# Patient Record
Sex: FEMALE | Race: WHITE
[De-identification: names, ages, dates, MRNs, and addresses within clinical notes are randomized per-mention and may not be internally consistent; named-entity substitution may affect disease eponyms.]

---

## 2017-07-26 ENCOUNTER — HOSPITAL ENCOUNTER (EMERGENCY)
Dept: HOSPITAL 25 - UCCORT | Age: 52
Discharge: HOME | End: 2017-07-26
Payer: COMMERCIAL

## 2017-07-26 VITALS — DIASTOLIC BLOOD PRESSURE: 73 MMHG | SYSTOLIC BLOOD PRESSURE: 123 MMHG

## 2017-07-26 DIAGNOSIS — J40: ICD-10-CM

## 2017-07-26 DIAGNOSIS — J32.9: Primary | ICD-10-CM

## 2017-07-26 DIAGNOSIS — Z87.891: ICD-10-CM

## 2017-07-26 PROCEDURE — 99212 OFFICE O/P EST SF 10 MIN: CPT

## 2017-07-26 PROCEDURE — G0463 HOSPITAL OUTPT CLINIC VISIT: HCPCS

## 2017-07-26 NOTE — UC
Throat Pain/Nasal Paras HPI





- HPI Summary


HPI Summary: 





FIVE DAYS FO CONGESTION, LAST NIGHT HIGH PITCHED COUGH, SHORTNESS OF BREATH. NO 

FEVER. 





- History of Current Complaint


Chief Complaint: UCRespiratory


Stated Complaint: COUGH


Time Seen by Provider: 07/26/17 19:51


Hx Obtained From: Patient


Hx Last Menstrual Period: 11 yrs


Onset/Duration: Gradual Onset, Lasting Weeks, Still Present


Severity: Moderate


Pain Intensity: 0


Pain Scale Used: 0-10 Numeric


Cough: Productive


Associated Signs & Symptoms: Positive: Sinus Discomfort, Nasal Discharge





- Epiglottits Risk Factors


Epiglottis Risk Factors: Negative





- Allergies/Home Medications


Allergies/Adverse Reactions: 


 Allergies











Allergy/AdvReac Type Severity Reaction Status Date / Time


 


Clindamycin Allergy  Rash Verified 07/26/17 19:52











Home Medications: 


 Home Medications





buPROPion TAB* [Wellbutrin TAB*] 100 mg PO DAILY 07/26/17 [History Confirmed 07/ 26/17]











PMH/Surg Hx/FS Hx/Imm Hx


Previously Healthy: Yes





- Surgical History


Surgical History: Yes


Surgery Procedure, Year, and Place: c sect x 3





- Family History


Known Family History: Positive: Cardiac Disease, Diabetes, Other - BLADDER 

CANCER


   Negative: Respiratory Disease





- Social History


Occupation: Employed Full-time


Lives: With Family


Alcohol Use: Occasionally


Substance Use Type: None


Smoking Status (MU): Former Smoker


Type: Cigarettes


Have You Smoked in the Last Year: Yes


When Did the Patient Quit Smoking/Using Tobacco: 6 MONTHS AGO





Review of Systems


Constitutional: Negative


Skin: Negative


Eyes: Negative


ENT: Ear Ache, Nasal Discharge, Sinus Congestion, Sinus Pain/Tenderness


Respiratory: Cough


Cardiovascular: Negative


Gastrointestinal: Negative


Genitourinary: Negative


Motor: Negative


Neurovascular: Negative


Musculoskeletal: Negative


Neurological: Negative


Psychological: Negative


All Other Systems Reviewed And Are Negative: Yes





Physical Exam


Triage Information Reviewed: Yes


Appearance: Well-Appearing, No Pain Distress, Well-Nourished


Vital Signs: 


 Initial Vital Signs











Temp  98.1 F   07/26/17 19:54


 


Pulse  97   07/26/17 19:54


 


Resp  28   07/26/17 19:54


 


BP  123/73   07/26/17 19:54


 


Pulse Ox  100   07/26/17 19:54











Vital Signs Reviewed: Yes


Eye Exam: Normal


ENT: Positive: Pharynx normal, Nasal congestion, TM bulging, TM dull


Dental Exam: Normal


Neck exam: Normal


Neck: Positive: Supple, Nontender, No Lymphadenopathy


Respiratory Exam: Other - COUGH


Respiratory: Positive: Chest non-tender, Lungs clear, Normal breath sounds, No 

respiratory distress, No accessory muscle use


Cardiovascular Exam: Normal


Cardiovascular: Positive: RRR, No Murmur, Pulses Normal


Abdominal Exam: Normal


Musculoskeletal Exam: Normal


Musculoskeletal: Positive: Strength Intact


Neurological Exam: Normal


Psychological Exam: Normal


Skin Exam: Normal





Throat Pain/Nasal Course/Dx





- Differential Dx/Diagnosis


Differential Diagnosis/HQI/PQRI: Pharyngitis, Tonsillitis, URI


Provider Diagnoses: SINUSITIS; BRONCHITIS





Discharge





- Discharge Plan


Condition: Stable


Disposition: HOME


Prescriptions: 


Azithromycin TAB* [Zithromax TAB (Z-CHRISTOPHER) 250 mg #6 tabs] 250 mg PO DAILY #4 tab


Benzonatate CAP* [Tessalon 100 MG CAP*] 100 mg PO TID PRN #15 cap


 PRN Reason: Cough


Patient Education Materials:  Sinusitis (ED), Upper Respiratory Infection (ED)


Referrals: 


Ayo HONEYCUTT,Angel CARROLL [Primary Care Provider] -

## 2018-01-29 ENCOUNTER — HOSPITAL ENCOUNTER (EMERGENCY)
Dept: HOSPITAL 25 - UCCORT | Age: 53
Discharge: HOME | End: 2018-01-29
Payer: COMMERCIAL

## 2018-01-29 VITALS — DIASTOLIC BLOOD PRESSURE: 72 MMHG | SYSTOLIC BLOOD PRESSURE: 134 MMHG

## 2018-01-29 DIAGNOSIS — J11.1: Primary | ICD-10-CM

## 2018-01-29 DIAGNOSIS — Z88.1: ICD-10-CM

## 2018-01-29 DIAGNOSIS — Z87.891: ICD-10-CM

## 2018-01-29 PROCEDURE — G0463 HOSPITAL OUTPT CLINIC VISIT: HCPCS

## 2018-01-29 PROCEDURE — 99212 OFFICE O/P EST SF 10 MIN: CPT

## 2018-01-29 NOTE — ED
Respiratory





- HPI Summary


HPI Summary: 





53 yr old with myalgias, cough, chills, nasal congestion.  Onset yesterday.  

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 Her daughter has symptoms as well a 

couple of days before. No other complaints. 





- History of Current Complaint


Chief Complaint: UCGeneralIllness


Stated Complaint: FLU SYMPTOMS


Time Seen by Provider: 01/29/18 20:44


Pain Intensity: 0





- Allergy/Home Medications


Allergies/Adverse Reactions: 


 Allergies











Allergy/AdvReac Type Severity Reaction Status Date / Time


 


Clindamycin Allergy  Rash Verified 01/29/18 20:39














PMH/Surg Hx/FS Hx/Imm Hx





- Surgical History


Surgery Procedure, Year, and Place: c sect x 3


Infectious Disease History: No


Infectious Disease History: 


   Denies: Traveled Outside the [place] in Last 30 Days





- Family History


Known Family History: Positive: Cardiac Disease, Diabetes, Other - BLADDER 

CANCER


   Negative: Respiratory Disease





- Social History


Occupation: Employed Full-time


Lives: With Family


Alcohol Use: Rare


Substance Use Type: Reports: None


Smoking Status (MU): Former Smoker


Type: Cigarettes


Have You Smoked in the Last Year: Yes





Review of Systems


Positive: Chills, Fatigue


Positive: Sore Throat, Nasal Discharge


Positive: Cough


All Other Systems Reviewed And Are Negative: Yes





Physical Exam


Triage Information Reviewed: Yes


Vital Signs On Initial Exam: 


 Initial Vitals











Temp Pulse Resp BP Pulse Ox


 


 98.6 F   113   24   134/72   100 


 


 01/29/18 20:35  01/29/18 20:35  01/29/18 20:35  01/29/18 20:35  01/29/18 20:35











Vital Signs Reviewed: Yes


Appearance: Positive: Well-Appearing, No Pain Distress


Skin: Positive: Warm, Skin Color Reflects Adequate Perfusion


Head/Face: Positive: Normal Head/Face Inspection


Eyes: Positive: EOMI


ENT: Positive: Pharynx normal, Nasal congestion, Nasal drainage, TMs normal


Neck: Positive: Nontender


Respiratory/Lung Sounds: Positive: Clear to Auscultation, Breath Sounds Present


Cardiovascular: Positive: RRR.  Negative: Murmur


Abdomen Description: Positive: Nontender


Musculoskeletal: Positive: Strength/ROM Intact


Neurological: Positive: Sensory/Motor Intact, Alert, Oriented to Person Place, 

Time, CN Intact II-III


Psychiatric: Positive: Normal


AVPU Assessment: Alert





- Divya Coma Scale


Best Eye Response: 4 - Spontaneous


Best Motor Response: 6 - Obeys Commands


Best Verbal Response: 5 - Oriented


Coma Scale Total: 15





Diagnostics





- Vital Signs


 Vital Signs











  Temp Pulse Resp BP Pulse Ox


 


 01/29/18 20:35  98.6 F  113  24  134/72  100














- Laboratory


Lab Statement: Any lab studies that have been ordered have been reviewed, and 

results considered in the medical decision making process.





Disposition





- Course


Course Of Treatment: 53 yr old with influenza symptoms. DC home on tamiflu





- Diagnoses


Provider Diagnoses: 


 Influenza








Discharge





- Discharge Plan


Condition: Good


Disposition: HOME


Prescriptions: 


Oseltamivir CAP* [Tamiflu CAP*] 75 mg PO BID #10 cap


Patient Education Materials:  Influenza in Children (ED)


Forms:  *School Release


Referrals: 


Ayo HONEYCUTT,Angel CARROLL [Primary Care Provider] - 2 Days

## 2019-07-06 ENCOUNTER — HOSPITAL ENCOUNTER (EMERGENCY)
Dept: HOSPITAL 25 - UCCORT | Age: 54
Discharge: HOME | End: 2019-07-06
Payer: COMMERCIAL

## 2019-07-06 VITALS — DIASTOLIC BLOOD PRESSURE: 69 MMHG | SYSTOLIC BLOOD PRESSURE: 117 MMHG

## 2019-07-06 DIAGNOSIS — Z87.891: ICD-10-CM

## 2019-07-06 DIAGNOSIS — J06.9: Primary | ICD-10-CM

## 2019-07-06 PROCEDURE — G0463 HOSPITAL OUTPT CLINIC VISIT: HCPCS

## 2019-07-06 PROCEDURE — 99212 OFFICE O/P EST SF 10 MIN: CPT

## 2019-07-06 NOTE — UC
Respiratory Complaint HPI





- HPI Summary


HPI Summary: 


Deep cough x3 days that came on suddenly. Pt feels it is deep and occasional 

wheezing.  Was rx'd an inhaler a few months ago, not using.  Feels feverish.  

dec.r appetite.  worse at night. 


smoker:former, quit 1 wk ago.


sick contacts:none


up to date with vaccines:up to date





- History of Current Complaint


Chief Complaint: UCGeneralIllness


Stated Complaint: COUGH


Time Seen by Provider: 07/06/19 17:15


Hx Obtained From: Patient


Hx Last Menstrual Period: 11 yrs


Onset/Duration: Sudden Onset


Pain Intensity: 0


Pain Scale Used: 0-10 Numeric


Aggravating Factors: Deep Breaths, Recumbent Position


Alleviating Factors: Nothing


Associated Signs And Symptoms: Positive: Fever, URI, Nasal Congestion.  Negative

: Dyspnea, Chills, Wheezing, Dizziness, Calf Pain, Calf Swelling





- Allergies/Home Medications


Allergies/Adverse Reactions: 


 Allergies











Allergy/AdvReac Type Severity Reaction Status Date / Time


 


clindamycin Allergy  Rash Verified 07/06/19 17:18














PMH/Surg Hx/FS Hx/Imm Hx


Previously Healthy: Yes


Endocrine History: Other - obesity





- Surgical History


Surgical History: Yes


Surgery Procedure, Year, and Place: c sect x 3





- Family History


Known Family History: Positive: Cardiac Disease, Diabetes, Other - BLADDER 

CANCER


   Negative: Respiratory Disease





- Social History


Alcohol Use: Rare


Substance Use Type: None


Smoking Status (MU): Former Smoker


Type: Cigarettes


Have You Smoked in the Last Year: Yes


When Did the Patient Quit Smoking/Using Tobacco: 6 MONTHS AGO





Review of Systems


All Other Systems Reviewed And Are Negative: Yes


Constitutional: Negative: Fever, Chills, Fatigue


Skin: Negative: Rash


Respiratory: Positive: Shortness Of Breath, Cough


Cardiovascular: Positive: Negative


Musculoskeletal: Negative: Edema, Myalgia


Neurological: Negative: Headache, Weakness





Physical Exam


Triage Information Reviewed: Yes


Appearance: Well-Appearing


Vital Signs: 


 Initial Vital Signs











Temp  97.8 F   07/06/19 17:13


 


Pulse  106   07/06/19 17:13


 


Resp  18   07/06/19 17:13


 


BP  117/69   07/06/19 17:13


 


Pulse Ox  99   07/06/19 17:13











Vital Signs Reviewed: Yes


Eyes: Positive: Conjunctiva Clear


ENT: Positive: Pharynx normal, Nasal congestion, TMs normal, Uvula midline.  

Negative: Muffled voice, Sinus tenderness


Neck: Positive: Supple, Nontender, No Lymphadenopathy


Respiratory: Positive: No accessory muscle use, Crackles - L base.  Negative: 

Decreased breath sounds, Rhonchi, Stridor


Cardiovascular Exam: Normal


Neurological: Positive: Alert





Respiratory Course/Dx





- Course


Course Of Treatment: 


Sudden deep cough, subj. fever.  on exam rales.  Afebrile today w/ good vitals. 

Will give her an inhaler since she is also a smoker even though she quite a 

week ago. Will also tx what seems like lower respiratory infection given lung 

sounds.  aware of QT prolongation w/ fluconazole and zpack but she will take 

fluconazole if she has symptoms.





- Differential Dx/Diagnosis


Differential Diagnosis/HQI/PQRI: Asthma, Bronchitis, Lower Resp Infection


Provider Diagnosis: 


 Lower respiratory infection








Discharge





- Sign-Out/Discharge


Documenting (check all that apply): Patient Departure


All imaging exams completed and their final reports reviewed: No Studies





- Discharge Plan


Condition: Good


Disposition: HOME


Prescriptions: 


Albuterol HFA INHALER* [Ventolin HFA Inhaler*] 2 puff INH Q4H PRN #1 mdi


 PRN Reason: Cough


Azithromycin TAB* [Zithromax TAB (Z-CHRISTOPHER) 250 mg #6 tabs] 2 tab PO .TODAY, THEN 

1 DAILY #1 christopher


Fluconazole 150 MG TAB* [Diflucan 150 MG TAB*] 150 mg PO ONCE 1 Days #1 tablet


Patient Education Materials:  Community Acquired Pneumonia (ED)


Referrals: 


Ayo HONEYCUTT,Angel CARROLL [Primary Care Provider] - 


Additional Instructions: 


I do think you have the early signs of pneumonia given the abnormal lungs 

sounds I heard. 


Please only take fluconazole if you have symptoms.





- Billing Disposition and Condition


Condition: GOOD


Disposition: Home

## 2020-02-04 ENCOUNTER — HOSPITAL ENCOUNTER (EMERGENCY)
Dept: HOSPITAL 25 - UCCORT | Age: 55
Discharge: HOME | End: 2020-02-04
Payer: COMMERCIAL

## 2020-02-04 VITALS — DIASTOLIC BLOOD PRESSURE: 72 MMHG | SYSTOLIC BLOOD PRESSURE: 129 MMHG

## 2020-02-04 DIAGNOSIS — Z87.891: ICD-10-CM

## 2020-02-04 DIAGNOSIS — Z88.1: ICD-10-CM

## 2020-02-04 DIAGNOSIS — Z88.5: ICD-10-CM

## 2020-02-04 DIAGNOSIS — H66.92: Primary | ICD-10-CM

## 2020-02-04 PROCEDURE — G0463 HOSPITAL OUTPT CLINIC VISIT: HCPCS

## 2020-02-04 PROCEDURE — 99212 OFFICE O/P EST SF 10 MIN: CPT

## 2020-02-04 NOTE — XMS REPORT
Continuity of Care Document (CCD)

 Created on:2019



Patient:Viviana Narayan

Sex:Female

:1965

External Reference #:[id number]





Demographics







 Address  [address] [place] [address]

 

 Home Phone  [phone]

 

 Mobile Phone  [phone]

 

 Work Phone  [phone]

 

 Preferred Language  en

 

 Marital Status  Not  or 

 

 Orthodoxy Affiliation  Unknown

 

 Race  White

 

 Ethnic Group  Not  or 









Author







 Name  Claudette Tucker NP (transmitted by agent of provider Nicholas Preston)

 

 Address  [address], [address]



   [place] [address]









Care Team Providers







 Name  Role  Phone

 

 Angel Rollins MD  Care Team Information   [phone]









Problems







 Description

 

 No Information Available







Social History







 Type  Date  Description  Comments

 

 Birth Sex    Unknown  

 

 ETOH Use  2019  Rarely consumes alcohol  

 

 Tobacco Use  Reviewed: 19  Patient is a current smoker, smokes every  



     day  

 

 Tobacco Use  Reviewed: 19  Heavy tobacco smoker (more than 10  



     cigarettes/day)  

 

 Smoking Status  Reviewed: 19  Heavy tobacco smoker (more than 10  



     cigarettes/day)  

 

 Tattoo/Piercing  2019  Negative For Tattoo  

 

 Tattoo/Piercing  2019  Pierced ears  







Allergies, Adverse Reactions, Alerts







 Description

 

 No Information Available







Medications







 Active Medications  SIG  Qnty  Indications  Ordering Provider  Date

 

 Suprep Bowel Prep Kit  take as directed  1units  Z12.11  Claudette Tucker NP  



           



 17.5-3.13-1.6GM/177ML          



 Solution          



           

 

 Vitamin D3  2 by mouth every      Unknown  



          25mcg (1000  day( gummies)        



 Ut) Capsules          



           









 History Medications









 No Active Medications        Unknown  2019 - 2019







Immunizations







 Description

 

 No Information Available







Vital Signs







 Date  Vital  Result  Comment

 

 2019  2:13pm  BP Systolic  122 mmHg  









 BP Diastolic  80 mmHg  

 

 Heart Rate  95 /min  

 

 Height  61 inches  5'1"

 

 Weight  199.00 lb  

 

 BMI (Body Mass Index)  37.6 kg/m2  

 

 Body Temperature  96.9 F  







Results







 Description

 

 No Information Available







Procedures







 Date  Code  Description  Status

 

 2019  76690309  Colonoscopy  Completed







Medical Devices







 Description

 

 No Information Available







Encounters







 Type  Date  Location  Provider  Dx  Diagnosis

 

 Office Visit  2019  [facility]  Claudette Tucker NP  Z12.11  
Encounter for



   2:00p  Center      screening for



           malignant



           neoplasm of colon









 R19.4  Change in bowel habit

 

 R19.5  Other fecal abnormalities







Assessments







 Date  Code  Description  Provider

 

 2019  Z12.11  Screening for malignant neoplasm of colon  Claudette Tucker NP

 

 2019  R19.4  Altered bowel function  Claudette Tucker NP

 

 2019  R19.5  Fecal occult blood: trace  Claudette Tucker NP







Plan of Treatment

Future Appointment(s):2020 10:00 am - Alondra Woodward MD at [facility]2019 - Claudette Tucker NPZ12.11 Encounter for screening for 
malignant neoplasm of ohkesL44.4 Change in bowel pkuekF73.5 Other fecal 
abnormalitiesNew Medication:Suprep Bowel Prep Kit 17.5-3.13-1.6 GM/177ML



Functional Status







 Description

 

 No Information Available







Mental Status







 Description

 

 No Information Available







Referrals







 Description

 

 No Information Available

## 2022-04-06 NOTE — UC
Ear Complaint HPI





- HPI Summary


HPI Summary: 





56 yo woman with 3 day hx of left ear pain without fever, sore throat, 

headache. She did have mild dizziness last week. uses Qtips. No fever or 

chills. Does have dental concerns, but no cold sensitivity on that side. 





- History of Current Complaint


Chief Complaint: UCEar


Stated Complaint: EAR PAIN


Time Seen by Provider: 02/04/20 17:49


Hx Obtained From: Patient


Hx Last Menstrual Period: 11 yrs


Onset/Duration: Gradual Onset, Lasting Days - 3


Severity Initially: Moderate


Severity Currently: Mild


Pain Intensity: 2


Aggravating Factors: Cold


Alleviating Factors: OTC Meds


Associated Signs/Symptoms: Positive: Trauma to Ear - uses qtips but does not 

recall an injury.





- Allergies/Home Medications


Allergies/Adverse Reactions: 


 Allergies











Allergy/AdvReac Type Severity Reaction Status Date / Time


 


clindamycin Allergy  Rash Verified 07/06/19 17:18


 


codeine Allergy  See Comment Verified 02/04/20 17:26











Home Medications: 


 Home Medications





Ibuprofen TAB* [Advil TAB*] 2 tab PO ONCE 02/04/20 [History Confirmed 02/04/20]











PMH/Surg Hx/FS Hx/Imm Hx


Previously Healthy: Yes





- Surgical History


Surgical History: Yes


Surgery Procedure, Year, and Place: c sect x 3





- Family History


Known Family History: Positive: Cardiac Disease, Diabetes, Other - BLADDER 

CANCER


   Negative: Respiratory Disease





- Social History


Occupation: Employed Full-time


Lives: With Family


Alcohol Use: Rare


Substance Use Type: None


Smoking Status (MU): Former Smoker


Type: Cigarettes


Have You Smoked in the Last Year: Yes


When Did the Patient Quit Smoking/Using Tobacco: 2017





Review of Systems


All Other Systems Reviewed And Are Negative: Yes


Constitutional: Positive: Fatigue - works 2 jobs


Skin: Positive: Negative


Eyes: Positive: Negative


ENT: Positive: Ear Ache.  Negative: Sore Throat, Nasal Discharge


Respiratory: Positive: Negative


Cardiovascular: Positive: Negative


Gastrointestinal: Positive: Negative


Genitourinary: Positive: Negative


Motor: Positive: Negative


Neurovascular: Positive: Negative


Musculoskeletal: Positive: Negative


Neurological: Positive: Negative.  Negative: Headache


Psychological: Positive: Negative


Is Patient Immunocompromised?: No





Physical Exam


Triage Information Reviewed: Yes


Appearance: Well-Appearing, No Pain Distress


Vital Signs: 


 Initial Vital Signs











Temp  98.2 F   02/04/20 17:27


 


Pulse  96   02/04/20 17:27


 


Resp  16   02/04/20 17:27


 


BP  129/72   02/04/20 17:27


 


Pulse Ox  98   02/04/20 17:27











ENT: Positive: Pharynx normal, TM dull, TM red - left TM dull and erythematous, 

mild bulging.


Dental Exam: Other - no percussion tenderness at #15, her only remaining upper 

molar. No abscess.


Neck: Positive: Supple, Nontender, Enlarged Nodes @ - mildly tender left upper 

right cervical node.


Respiratory: Positive: Lungs clear, Normal breath sounds


Cardiovascular: Positive: RRR, No Murmur


Musculoskeletal Exam: Normal


Neurological Exam: Normal


Psychological Exam: Normal


Skin Exam: Normal





Ear Complaint Course/Dx





- Course


Course Of Treatment: 





amoxicillin for otitis media. 





- Differential Dx/Diagnosis


Differential Diagnosis/HQI/PQRI: Cerumen Impaction, Otitis Externa, Otitis Media


Provider Diagnosis: 


 Left otitis media








Discharge ED





- Sign-Out/Discharge


Documenting (check all that apply): Patient Departure


All imaging exams completed and their final reports reviewed: No Studies





- Discharge Plan


Condition: Stable


Disposition: HOME


Prescriptions: 


Amoxicillin PO (*) [Amoxicillin 875 MG (*)] 875 mg PO BID #14 tab


Patient Education Materials:  Ear Infection (ED)


Referrals: 


Ayo HONEYCUTT,Angel CARROLL [Primary Care Provider] - 


Additional Instructions: 


Please take the full course of antibiotics. 


Use ibuprofen as needed for control of pain. 





- Billing Disposition and Condition


Condition: STABLE


Disposition: Home Surgeon: Rip Mosqueda M.D.